# Patient Record
Sex: FEMALE | Race: WHITE | NOT HISPANIC OR LATINO | Employment: FULL TIME | URBAN - METROPOLITAN AREA
[De-identification: names, ages, dates, MRNs, and addresses within clinical notes are randomized per-mention and may not be internally consistent; named-entity substitution may affect disease eponyms.]

---

## 2017-07-24 ENCOUNTER — TRANSCRIBE ORDERS (OUTPATIENT)
Dept: PHYSICAL THERAPY | Facility: CLINIC | Age: 57
End: 2017-07-24

## 2017-07-24 ENCOUNTER — TREATMENT (OUTPATIENT)
Dept: PHYSICAL THERAPY | Facility: CLINIC | Age: 57
End: 2017-07-24

## 2017-07-24 DIAGNOSIS — S46.911A SHOULDER STRAIN, RIGHT, INITIAL ENCOUNTER: Primary | ICD-10-CM

## 2017-07-24 DIAGNOSIS — S46.911A SHOULDER STRAIN, RIGHT, INITIAL ENCOUNTER: ICD-10-CM

## 2017-07-24 DIAGNOSIS — M25.511 ACUTE PAIN OF RIGHT SHOULDER: Primary | ICD-10-CM

## 2017-07-24 PROCEDURE — 97110 THERAPEUTIC EXERCISES: CPT | Performed by: PHYSICAL THERAPIST

## 2017-07-24 PROCEDURE — 97001 PR PHYS THERAPY EVALUATION: CPT | Performed by: PHYSICAL THERAPIST

## 2017-07-24 PROCEDURE — 97014 ELECTRIC STIMULATION THERAPY: CPT | Performed by: PHYSICAL THERAPIST

## 2017-07-24 PROCEDURE — A4595 TENS SUPPL 2 LEAD PER MONTH: HCPCS | Performed by: PHYSICAL THERAPIST

## 2017-07-24 PROCEDURE — 97530 THERAPEUTIC ACTIVITIES: CPT | Performed by: PHYSICAL THERAPIST

## 2017-07-24 NOTE — PROGRESS NOTES
"Physical Therapy Initial Evaluation and Plan of Care    TIME IN 1112 TIME OUT 1222  Patient: Regla Daly   : 1960  Diagnosis/ICD-10 Code:  Acute pain of right shoulder [M25.511]  Referring practitioner: No ref. provider found    Subjective Evaluation    History of Present Illness  Date of onset: 2017  Mechanism of injury: Putting heavy box onto rack, weight fell back onto (R) UE/shoulder, but patient pushed it forward back onto rack.  Brief tingling (R) hand radiating upward to shoulder which resolved. Remainder of that day she felt fine but the next morning pain onset (R) shoulder and radiating down to (L) hand, especially (L) 4th digit.    17 - [facility] in [place].  Given sling, Naproxen, and prednisone.  Taken off work until Friday.  Friday presented to  RP - xray (negtive), continue meds, start PT.    Took 2 vacation days so will go back to work light duty 17.    Difficulty sleeping/difficulty getting comfortable laying supine/wakes occasionally due to (R) UE pain.  Slept in chair last night with arm propped. Denies prior (R) shoulder injury.      Patient Occupation: Drives [employer]  - [employer]   Precautions and Work Restrictions: see scanned work restriction sheetQuality of life: good    Pain  Current pain ratin  At best pain ratin  Pain scale at highest: 6-7/10.  Location: (R) anterior and superior shoulder, upper arm, forearm, and down into fingers (nemo. digits 4-5)  Quality: dull ache (\"tired feeling\")  Relieving factors: ice, medications, relaxation and rest  Exacerbated by: AROM (R) UE, lying down to sleep.  Progression: no change    Social Support  Lives with: significant other (boyfriend)    Hand dominance: left    Diagnostic Tests  X-ray: normal    Treatments  Current treatment: medication and physical therapy  Current treatment comments: work restrictions as documented on scanned sheet.     Patient Goals  Patient goals for therapy: " "decreased pain and return to work  Patient goal: \"get back to normal\"           Objective     Tenderness     Right Shoulder  Tenderness in the AC joint, biceps tendon (proximal), bicipital groove and supraspinatus tendon.     Additional Tenderness Details  Generalized max (+) TTP (R) superior and anterior shoulder, biceps muscle and medial upper arm, (R) forearm/wrist/hand (generalized)    Active Range of Motion   Left Shoulder   Flexion: 150 degrees   Extension: 49 degrees   Abduction: 157 degrees   External rotation 90°: 88 degrees   Internal rotation 90°: 65 degrees     Right Shoulder   Flexion: 85 degrees with pain  Extension: 50 (\"a little pain at the end\") degrees   Abduction: 72 degrees with pain  External rotation BTH: C2 with pain  Internal rotation BTB: L5 with pain    Left Elbow   Flexion: 142 degrees   Extension: 12 (lacking full extension) degrees     Right Elbow   Flexion: 120 degrees with pain  Extension: 37 (lacking full extension due to pain) degrees with pain    Strength/Myotome Testing     Left Shoulder     Planes of Motion   Left shoulder forward flexion strength: 5-/5.   Extension: 5   Left shoulder abduction strength: 5-/5.   Left shoulder external rotation strength at 0 degrees: 5-/5.   Left shoulder internal rotation strength at 0 degrees: 5-/5.     Right Shoulder     Planes of Motion   Flexion: 3- (R) shoulder, forearm, and wrist/hand pain)   Extension: 3+ (with pain)   Abduction: 3 (R) shld pain   External rotation at 0°: 3+ (with pain (R) forearm/wrist/hand)   Internal rotation at 0°: 4- (with pain)     Left Elbow   Flexion: 5  Extension strength: 5-/5.    Right Elbow   Flexion: 3+ (with generalized pain (R) UE)  Extension: 4- (with generalized pain (R) UE)    Tests     Additional Tests Details  Patient is unable to tolerate positions required for special testing this date.         Assessment & Plan     Assessment  Impairments: abnormal or restricted ROM, activity intolerance, impaired " "physical strength, lacks appropriate home exercise program and pain with function  Other impairment: Inability to perform full duty work tasks  Assessment details: STGs: to be met in 2 weeks  1. Patient will be independent with initial HEP  2. Patient will report improved (R) shoulder s/s 0-4/10  3. Patient will demonstrate improved (R) shoulder flexion and abduction > 120 deg  4. Patient will demonstrate (R) elbow extension equal to (L)  5. Patient will exhibit improved (R) UE strength >/= 4+/5    LTGs: to be met in 4 weeks  1. Patient will be independent with progressed strength and stabilization HEP  2. Patient will demonstrate (R) shoulder AROM WFL all planes, painfree  3. Patient will demonstrate improved (R) shoulder strength >/= 5-/5 all planes  4. Patient will report improved s/s 0-2/10  5. Patient will perform all ADLs at >/= 90% normal ability and report return to normal sleep ability  6. Patient will demonstrate readiness to RTW without restrictions  Prognosis: good    Goals  \"get back to normal\"    Plan  Therapy options: will be seen for skilled physical therapy services  Planned modality interventions: cryotherapy, electrical stimulation/Russian stimulation, iontophoresis, thermotherapy (hydrocollator packs) and ultrasound  Planned therapy interventions: fine motor coordination training, flexibility, functional ROM exercises, home exercise program, joint mobilization, manual therapy, neuromuscular re-education, soft tissue mobilization, strengthening, stretching and therapeutic activities  Other planned therapy interventions: simulated work tasks as appropriate  Frequency: 3x week  Duration in weeks: 4  Treatment plan discussed with: patient  Plan details: Patient will have weekly follow-ups with Dr. Lopez  Functional Limitations: carrying objects, lifting, sleeping, pulling, pushing, uncomfortable because of pain, reaching behind back, reaching overhead and unable to perform repetitive " tasks      Manual Therapy:         mins  03525;  Therapeutic Exercise:    19     mins  59485;     Neuromuscular Cammy:        mins  24090;    Therapeutic Activity:     14     mins  60205;     Gait Training:           mins  56796;     Ultrasound:          mins  55438;    Electrical Stimulation:    15     mins  40019 ( );  Dry Needling          mins self-pay    Timed Treatment:   33   mins   Total Treatment:     70   mins    PT SIGNATURE: Mona Powre PT, DPT   DATE TREATMENT INITIATED: 7/24/2017    Initial Certification  Certification Period: 10/22/2017  I certify that the therapy services are furnished while this patient is under my care.  The services outlined above are required by this patient, and will be reviewed every 90 days.     PHYSICIAN:       DATE:     Please sign and return via fax to [phone].. Thank you, [facility].

## 2017-07-25 ENCOUNTER — TREATMENT (OUTPATIENT)
Dept: PHYSICAL THERAPY | Facility: CLINIC | Age: 57
End: 2017-07-25

## 2017-07-25 DIAGNOSIS — M25.511 ACUTE PAIN OF RIGHT SHOULDER: Primary | ICD-10-CM

## 2017-07-25 DIAGNOSIS — S46.911D SHOULDER STRAIN, RIGHT, SUBSEQUENT ENCOUNTER: ICD-10-CM

## 2017-07-25 PROCEDURE — 97014 ELECTRIC STIMULATION THERAPY: CPT | Performed by: PHYSICAL THERAPIST

## 2017-07-25 PROCEDURE — 97530 THERAPEUTIC ACTIVITIES: CPT | Performed by: PHYSICAL THERAPIST

## 2017-07-25 PROCEDURE — 97110 THERAPEUTIC EXERCISES: CPT | Performed by: PHYSICAL THERAPIST

## 2017-07-25 NOTE — PROGRESS NOTES
"Physical Therapy Daily Progress Note    Time In 1100  Time Out 1202    Regla Daly reports: \"My shoulder is feeling better than it was yesterday, knock on wood.  It's still bothering me but not as bad.  I'm so glad.  I just want to get better and get back to normal.\"    Subjective     Objective   See Exercise, Manual, and Modality Logs for complete treatment.   *Added Swiss ball table stretches, pulleys, and scap retracts    Assessment & Plan     Assessment  Assessment details: Patient requires multiple and prolonged cueing for correct exercise technique with fair return.  She exhibits mild to at times moderate muscle guarding with exercises performed this date but utilizes increased ranges of motion during activity performance compared to initial evaluation (yesterday).  She is motivated and compliant with all activities and responds positively (reports mildly decreased s/s) with modalities.        Progress per Plan of Care         Manual Therapy:         mins  22825;  Therapeutic Exercise:    27     mins  70228;     Neuromuscular Cammy:        mins  71839;    Therapeutic Activity:     13     mins  95612;     Gait Training:           mins  38567;     Ultrasound:          mins  01384;    Electrical Stimulation:    15     mins  56012 ( );  Dry Needling          mins self-pay    Timed Treatment:   40   mins   Total Treatment:     62   mins    Mona Power PT, DPT  Physical Therapist  [place] License # [id number]    "

## 2017-07-26 ENCOUNTER — TREATMENT (OUTPATIENT)
Dept: PHYSICAL THERAPY | Facility: CLINIC | Age: 57
End: 2017-07-26

## 2017-07-26 DIAGNOSIS — M25.511 ACUTE PAIN OF RIGHT SHOULDER: Primary | ICD-10-CM

## 2017-07-26 DIAGNOSIS — S46.911D SHOULDER STRAIN, RIGHT, SUBSEQUENT ENCOUNTER: ICD-10-CM

## 2017-07-26 PROCEDURE — 97014 ELECTRIC STIMULATION THERAPY: CPT | Performed by: PHYSICAL THERAPIST

## 2017-07-26 PROCEDURE — 97110 THERAPEUTIC EXERCISES: CPT | Performed by: PHYSICAL THERAPIST

## 2017-07-26 NOTE — PROGRESS NOTES
"Physical Therapy Daily Progress Note    Time In 1403  Time Out 1454    Regla Daly reports: \"My shoulder is feeling a lot better.  I can raise it better and I have been working on my exercises and trying to do a little more with it.  I am so glad.  I still can't do everything normally but it's so much better than it was.\"    Subjective     Objective   See Exercise, Manual, and Modality Logs for complete treatment.       Assessment & Plan     Assessment  Assessment details: Patient demonstrates significantly improved though not yet normalized (R) shoulder AROM flexion.  She verbalizes steadily improving s/s (R) UE and demonstrates improved tolerance and ability to perform active assistive stretches and active range of motion activities compared to first visit.  Issued red theraband and issued updated HEP printout to facilitate compliance.        Progress per Plan of Care         Manual Therapy:         mins  93604;  Therapeutic Exercise:    36     mins  72501;     Neuromuscular Cammy:        mins  87793;    Therapeutic Activity:          mins  27025;     Gait Training:           mins  19749;     Ultrasound:          mins  50182;    Electrical Stimulation:    15     mins  87793 ( );  Dry Needling          mins self-pay    Timed Treatment: 36     mins   Total Treatment:     51   mins    Mona Power PT, DPT  Physical Therapist  [place] License # [id number]    "

## 2017-07-27 ENCOUNTER — TREATMENT (OUTPATIENT)
Dept: PHYSICAL THERAPY | Facility: CLINIC | Age: 57
End: 2017-07-27

## 2017-07-27 DIAGNOSIS — S46.911D SHOULDER STRAIN, RIGHT, SUBSEQUENT ENCOUNTER: ICD-10-CM

## 2017-07-27 DIAGNOSIS — M25.511 ACUTE PAIN OF RIGHT SHOULDER: Primary | ICD-10-CM

## 2017-07-27 PROCEDURE — 97110 THERAPEUTIC EXERCISES: CPT | Performed by: PHYSICAL THERAPIST

## 2017-07-27 PROCEDURE — 97014 ELECTRIC STIMULATION THERAPY: CPT | Performed by: PHYSICAL THERAPIST

## 2017-07-27 PROCEDURE — 97530 THERAPEUTIC ACTIVITIES: CPT | Performed by: PHYSICAL THERAPIST

## 2017-07-27 NOTE — PROGRESS NOTES
"Physical Therapy Daily Progress Note    Time In 1437  Time Out 1527    Regla Daly reports: \"I'm doing good today. My shoulder is feeling good and I am so thankful.  I am just tired from work today.  We had a truck come in and I had to scan everything in.\"    Subjective     Objective   See Exercise, Manual, and Modality Logs for complete treatment.   *Added seated RTC 3-way, sidelying ER, and ball wall circles  *Increased repetitions of most strengthening exercises    Assessment & Plan     Assessment  Assessment details: Patient remains motivated and compliant with PT interventions.  She consistently reports improving (R) UE s/s and demonstrates ability to raise (R) UE, though not yet to functional ROM, with very minimal pain/soreness.  (R) shoulder AAROM is WFL, painfree.  She does require persistent, intermittent cues to decrease upper trap compensation (R) UE during elevation and ROM activities.        Other - reassess for MD.         Manual Therapy:         mins  17610;  Therapeutic Exercise:    21     mins  59661;     Neuromuscular Cammy:        mins  60345;    Therapeutic Activity:     14     mins  53794;     Gait Training:           mins  13712;     Ultrasound:          mins  51706;    Electrical Stimulation:    15     mins  38024 ( );  Dry Needling          mins self-pay    Timed Treatment:   35   mins   Total Treatment:     50   mins    Mona Power PT, DPT  Physical Therapist  [place] License # [id number]    "

## 2017-07-28 ENCOUNTER — TREATMENT (OUTPATIENT)
Dept: PHYSICAL THERAPY | Facility: CLINIC | Age: 57
End: 2017-07-28

## 2017-07-28 DIAGNOSIS — S46.911D SHOULDER STRAIN, RIGHT, SUBSEQUENT ENCOUNTER: ICD-10-CM

## 2017-07-28 DIAGNOSIS — M25.511 ACUTE PAIN OF RIGHT SHOULDER: Primary | ICD-10-CM

## 2017-07-28 PROCEDURE — 97110 THERAPEUTIC EXERCISES: CPT | Performed by: PHYSICAL THERAPIST

## 2017-07-28 PROCEDURE — 97530 THERAPEUTIC ACTIVITIES: CPT | Performed by: PHYSICAL THERAPIST

## 2017-07-28 PROCEDURE — 97014 ELECTRIC STIMULATION THERAPY: CPT | Performed by: PHYSICAL THERAPIST

## 2017-07-28 NOTE — PROGRESS NOTES
"Physical Therapy Progress Note    Time In 1412  Time Out 1510      2017  Amor Lopez MD    Re: Regla Daly  ________________________________________________________________    Ms. Regla Daly, has attended 5/5 PT sessions.  Treatment has consisted of: patient education, therapeutic activity, therapeutic exercise, and modalities.     S: Ms. Regla Daly states: \"My shoulder is feeling tons better.  It feels almost 100% back to normal.  It just feels a little bit weaker than my left arm. I am doing fine with my light duty activities and not having any problem with my right arm.  I am ready to be released back to my normal job. I am planning to keep up with my exercises even after I go back to my normal job.\"     Subjective Evaluation    Pain  Current pain ratin  At best pain ratin           Objective     Tenderness     Right Shoulder  No tenderness     Active Range of Motion     Right Shoulder   Flexion: 152 degrees   Extension: 57 degrees   Abduction: 164 degrees   External rotation 90°: 93 degrees  Internal rotation 90°: 51 (\"a little tightness, stiffness\") degrees     Strength/Myotome Testing     Right Shoulder     Planes of Motion   Flexion: 5   Extension: 5   Right shoulder abduction strength: 5-/5.   Right shoulder external rotation strength at 0 degrees: 5-/5.   Internal rotation at 0°: 5      See Exercise, Manual, and Modality Logs for complete treatment.       Assessment & Plan     Assessment  Assessment details: Patient demonstrates significant improvements since beginning PT.  Subjectively, she consistently reports 0/10 pain (R) shoulder and good improvements in ADL performance and light duty work task tolerance.  Objectively, she demonstrates (R) shoulder AROM and strength WFL.  She is nontender to palpation (R) shoulder.  From a PT standpoint, she appears ready and capable of returning to work without restrictions if you are in agreement.        Other - to follow up with Dr." Jessica after PT session.         Manual Therapy:         mins  66367;  Therapeutic Exercise:    28     mins  24896;     Neuromuscular Cammy:        mins  37015;    Therapeutic Activity:     15     mins  40360;     Gait Training:           mins  22427;     Ultrasound:          mins  11643;    Electrical Stimulation:    15     mins  90635 ( );  Dry Needling          mins self-pay    Timed Treatment:   43   mins   Total Treatment:     58   mins    Mona Power PT, DPT  Physical Therapist  [place] License # [id number]